# Patient Record
Sex: FEMALE | URBAN - METROPOLITAN AREA
[De-identification: names, ages, dates, MRNs, and addresses within clinical notes are randomized per-mention and may not be internally consistent; named-entity substitution may affect disease eponyms.]

---

## 2018-07-09 ENCOUNTER — IMPORTED ENCOUNTER (OUTPATIENT)
Dept: URBAN - METROPOLITAN AREA CLINIC 1 | Facility: CLINIC | Age: 48
End: 2018-07-09

## 2018-07-09 PROBLEM — Z96.1: Noted: 2018-07-09

## 2018-07-09 PROBLEM — E11.3553: Noted: 2018-07-09

## 2018-07-09 PROBLEM — Z79.4: Noted: 2018-07-09

## 2018-07-09 PROBLEM — H26.492: Noted: 2018-07-09

## 2018-07-09 PROBLEM — H31.092: Noted: 2018-07-09

## 2018-07-09 PROCEDURE — 92015 DETERMINE REFRACTIVE STATE: CPT

## 2018-07-09 PROCEDURE — 92004 COMPRE OPH EXAM NEW PT 1/>: CPT

## 2018-07-09 NOTE — PATIENT DISCUSSION
PCO OS-Visually Significant and yag cap recommended. Risks and benefits discussed with pt and pt desires to schedule yag cap.

## 2018-07-09 NOTE — PATIENT DISCUSSION
1.  DM Type II (Insulin) with regressed Proliferative Diabetic Retinopathy stable OU. S/p Vitrectomy OU s/p PRP OU. Receiving injections (Eyelea) by Dr. Izabel Corrales. Follow up as scheduled with Dr. Izabel Corrales. 2.   PCO OS-Visually Significant and yag cap recommended. Risks and benefits discussed with pt and pt desires to schedule yag cap. Discussed the potential for visual outcome due to CR Scar OS and regressed PDR OS. PMG evaluated today. 3.  Pseudophakia OU - h/o YAG Cap OS 4. Chorioretinal Scars OS Return for an appointment in YAG Cap OS with Dr. Hardy Reyes.

## 2018-09-07 ENCOUNTER — IMPORTED ENCOUNTER (OUTPATIENT)
Dept: URBAN - METROPOLITAN AREA CLINIC 1 | Facility: CLINIC | Age: 48
End: 2018-09-07

## 2018-09-07 PROBLEM — H26.492: Noted: 2018-09-07

## 2018-09-07 PROCEDURE — 66821 AFTER CATARACT LASER SURGERY: CPT

## 2018-09-07 NOTE — PATIENT DISCUSSION
YAG CAP OS: (Consent signed and scanned into attachments) 1 gtt Prolensa applied. The purpose and nature of the procedure possible alternative methods of treatment the risks involved and the possibility of complications were discussed with patient. The Patient wishes to proceed and the consent was signed. The laser was then performed under topical anesthesia with no complications. Post op instructions were given to patient as well as a follow-up appointment. Patient was advised to call our office if any questions or concerns.   1-6 week YAG PO.

## 2018-10-26 ENCOUNTER — IMPORTED ENCOUNTER (OUTPATIENT)
Dept: URBAN - METROPOLITAN AREA CLINIC 1 | Facility: CLINIC | Age: 48
End: 2018-10-26

## 2018-10-26 PROBLEM — Z09: Noted: 2018-10-26

## 2018-10-26 PROCEDURE — 99024 POSTOP FOLLOW-UP VISIT: CPT

## 2018-10-26 NOTE — PATIENT DISCUSSION
1. PO YAG Cap OS -- Good result despite VA being limited by DM PDR. MRX given @ [facility]. F/u as scheduled w/ Dr. Adiel Akhatr. Return for an appointment in July 2019 for a 30 OU with Dr. Sabine Mayo.

## 2022-04-02 ASSESSMENT — TONOMETRY
OD_IOP_MMHG: 22
OS_IOP_MMHG: 19
OS_IOP_MMHG: 20

## 2022-04-02 ASSESSMENT — VISUAL ACUITY
OS_CC: 20/150
OD_CC: 20/25
OS_CC: 20/150